# Patient Record
Sex: FEMALE | Employment: FULL TIME | URBAN - METROPOLITAN AREA
[De-identification: names, ages, dates, MRNs, and addresses within clinical notes are randomized per-mention and may not be internally consistent; named-entity substitution may affect disease eponyms.]

---

## 2017-07-01 ENCOUNTER — HOSPITAL ENCOUNTER (EMERGENCY)
Age: 32
Discharge: HOME | End: 2017-07-01
Attending: EMERGENCY MEDICINE
Payer: COMMERCIAL

## 2017-07-01 VITALS
OXYGEN SATURATION: 99 % | TEMPERATURE: 98.3 F | RESPIRATION RATE: 16 BRPM | HEART RATE: 93 BPM | BODY MASS INDEX: 30 KG/M2 | WEIGHT: 175.71 LBS | HEIGHT: 64 IN | DIASTOLIC BLOOD PRESSURE: 87 MMHG | SYSTOLIC BLOOD PRESSURE: 134 MMHG

## 2017-07-01 DIAGNOSIS — R35.0 URINARY FREQUENCY: Primary | ICD-10-CM

## 2017-07-01 LAB
APPEARANCE UR: CLEAR
BACTERIA URNS QL MICRO: NEGATIVE /HPF
BILIRUB UR QL: NEGATIVE
CLUE CELLS VAG QL WET PREP: NORMAL
COLOR UR: NORMAL
EPITH CASTS URNS QL MICRO: NORMAL /LPF
GLUCOSE BLD STRIP.AUTO-MCNC: 90 MG/DL (ref 65–100)
GLUCOSE UR STRIP.AUTO-MCNC: NEGATIVE MG/DL
HCG UR QL: NEGATIVE
HGB UR QL STRIP: NEGATIVE
HYALINE CASTS URNS QL MICRO: NORMAL /LPF (ref 0–5)
KETONES UR QL STRIP.AUTO: NEGATIVE MG/DL
KOH PREP SPEC: NORMAL
LEUKOCYTE ESTERASE UR QL STRIP.AUTO: NEGATIVE
NITRITE UR QL STRIP.AUTO: NEGATIVE
PH UR STRIP: 7.5 [PH] (ref 5–8)
PROT UR STRIP-MCNC: NEGATIVE MG/DL
RBC #/AREA URNS HPF: NORMAL /HPF (ref 0–5)
SERVICE CMNT-IMP: NORMAL
SERVICE CMNT-IMP: NORMAL
SP GR UR REFRACTOMETRY: 1.01 (ref 1–1.03)
T VAGINALIS VAG QL WET PREP: NORMAL
UA: UC IF INDICATED,UAUC: NORMAL
UROBILINOGEN UR QL STRIP.AUTO: 0.2 EU/DL (ref 0.2–1)
WBC URNS QL MICRO: NORMAL /HPF (ref 0–4)

## 2017-07-01 PROCEDURE — 74011000250 HC RX REV CODE- 250: Performed by: PHYSICIAN ASSISTANT

## 2017-07-01 PROCEDURE — 81001 URINALYSIS AUTO W/SCOPE: CPT

## 2017-07-01 PROCEDURE — 74011250636 HC RX REV CODE- 250/636: Performed by: PHYSICIAN ASSISTANT

## 2017-07-01 PROCEDURE — 81025 URINE PREGNANCY TEST: CPT

## 2017-07-01 PROCEDURE — 96372 THER/PROPH/DIAG INJ SC/IM: CPT

## 2017-07-01 PROCEDURE — 87491 CHLMYD TRACH DNA AMP PROBE: CPT

## 2017-07-01 PROCEDURE — 87210 SMEAR WET MOUNT SALINE/INK: CPT

## 2017-07-01 PROCEDURE — 74011250637 HC RX REV CODE- 250/637: Performed by: PHYSICIAN ASSISTANT

## 2017-07-01 PROCEDURE — 82962 GLUCOSE BLOOD TEST: CPT

## 2017-07-01 PROCEDURE — 99284 EMERGENCY DEPT VISIT MOD MDM: CPT

## 2017-07-01 RX ORDER — AZITHROMYCIN 250 MG/1
1000 TABLET, FILM COATED ORAL
Status: COMPLETED | OUTPATIENT
Start: 2017-07-01 | End: 2017-07-01

## 2017-07-01 RX ORDER — PHENAZOPYRIDINE HYDROCHLORIDE 200 MG/1
200 TABLET, FILM COATED ORAL 3 TIMES DAILY
Qty: 10 TAB | Refills: 0 | Status: SHIPPED | OUTPATIENT
Start: 2017-07-01 | End: 2017-07-05

## 2017-07-01 RX ADMIN — LIDOCAINE HYDROCHLORIDE 250 MG: 10 INJECTION, SOLUTION EPIDURAL; INFILTRATION; INTRACAUDAL; PERINEURAL at 15:10

## 2017-07-01 RX ADMIN — AZITHROMYCIN 1000 MG: 250 TABLET, FILM COATED ORAL at 15:10

## 2017-07-01 NOTE — ED PROVIDER NOTES
HPI Comments: Jessica Ewing is a 34 y.o. female with a PMH of depression and anxiety presenting ambulatory to the ED from home C/O urinary frequency x \"several months\". She endorses  associated symptoms of left flank pain x 1 week and vaginal discharge. She notes that her urine and discharge both have a foul odor. Pt explains that she recently began taking Lexapro and has been having shoulder, neck, and flank pain since starting the medication. She notes that the medication makes her anxious, and causes her muscles to tense. She states that she has never been evaluated for her urinary symptoms, but she has and appointment with a PCP on 4/5/17. Pt reports that she visits a Psychiatrist for the management of her depression and anxiety. She also reports that she had had normal PO intake. She denies taking birth control medication. Pt notes that she smokes approximately 1 pack of cigarettes per day. Patient denies any other symptoms or complaints. PCP: None    There are no other complaints, changes or physical findings at this time. The history is provided by the patient. No  was used. No past medical history on file. No past surgical history on file. No family history on file. Social History     Social History    Marital status:      Spouse name: N/A    Number of children: N/A    Years of education: N/A     Occupational History    Not on file. Social History Main Topics    Smoking status: Current Every Day Smoker    Smokeless tobacco: Not on file    Alcohol use Not on file    Drug use: Not on file    Sexual activity: Not on file     Other Topics Concern    Not on file     Social History Narrative         ALLERGIES: Review of patient's allergies indicates no known allergies. Review of Systems   Constitutional: Negative for chills and fever. HENT: Negative for sore throat. Eyes: Negative for pain.    Respiratory: Negative for cough and shortness of breath. Cardiovascular: Negative for chest pain. Gastrointestinal: Negative for abdominal pain, diarrhea, nausea and vomiting. Genitourinary: Positive for flank pain, frequency and vaginal discharge. Negative for dysuria and hematuria. Musculoskeletal: Negative for arthralgias and myalgias. Skin: Negative for rash. Neurological: Negative for dizziness, light-headedness, numbness and headaches. Psychiatric/Behavioral: Negative for behavioral problems and confusion. Vitals:    07/01/17 1426   BP: 134/87   Pulse: 93   Resp: 16   Temp: 98.3 °F (36.8 °C)   SpO2: 99%   Weight: 79.7 kg (175 lb 11.3 oz)   Height: 5' 4\" (1.626 m)            Physical Exam   Constitutional: She is oriented to person, place, and time. She appears well-developed and well-nourished. Pt appears to be slightly anxious   HENT:   Head: Normocephalic and atraumatic. Right Ear: External ear normal.   Left Ear: External ear normal.   Nose: Nose normal.   Eyes: Conjunctivae and EOM are normal.   Neck: Normal range of motion. Neck supple. Cardiovascular: Normal rate, regular rhythm, normal heart sounds and intact distal pulses. No murmur heard. Pulmonary/Chest: Effort normal and breath sounds normal. She has no decreased breath sounds. She has no wheezes. Abdominal: Soft. Bowel sounds are normal. She exhibits no distension. There is no tenderness. There is no guarding. No CVA tenderness bilaterally   Musculoskeletal: Normal range of motion. She exhibits no edema or tenderness. Neurological: She is alert and oriented to person, place, and time. Skin: Skin is warm and dry. No rash noted. She is not diaphoretic. Psychiatric: Her behavior is normal. Judgment normal.   Nursing note and vitals reviewed.        MDM  Number of Diagnoses or Management Options  Urinary frequency:   Diagnosis management comments: DDx: DDx: vulvovaginal vs. vaginal candidiasis, bacterial vaginosis, trichomoniasis, gonorrhea, chlamydia, pelvic inflammatory disease, urinary tract infection, cystitis, pyelonephritis. Patient presents with vaginal discharge and increased urinary frequency for the past month. No acute abnormalities found in lab work today. Advised that symptoms may be related to recently starting Lexapro. Advised f/u with her Psychiatrist for further medication management. Amount and/or Complexity of Data Reviewed  Clinical lab tests: ordered and reviewed  Review and summarize past medical records: yes    Patient Progress  Patient progress: stable    ED Course       Pelvic Exam  Date/Time: 7/1/2017 2:53 PM  Performed by: PA  Procedure duration:  10 minutes. Exam assisted by:  Cora Marte RN. Type of exam performed: bimanual and speculum. External genitalia appearance: normal.    Vaginal exam:  normal.    Cervical exam:  normal, no cervical motion tenderness and os closed. Specimen(s) collected:  chlamydia and GC. Bimanual exam:  normal.    Patient tolerance: Patient tolerated the procedure well with no immediate complications          Chief Complaint   Patient presents with    Urinary Frequency     with lower back pain x several months       2:30 PM  The patients presenting problems have been discussed, and they are in agreement with the care plan formulated and outlined with them. I have encouraged them to ask questions as they arise throughout their visit.     MEDICATIONS GIVEN:  Medications   azithromycin (ZITHROMAX) tablet 1,000 mg (1,000 mg Oral Given 7/1/17 1510)   cefTRIAXone (ROCEPHIN) 250 mg in lidocaine (PF) (XYLOCAINE) 10 mg/mL (1 %) IM injection (250 mg IntraMUSCular Given 7/1/17 1510)       LABS REVIEWED:  Recent Results (from the past 24 hour(s))   URINALYSIS W/ REFLEX CULTURE    Collection Time: 07/01/17  2:39 PM   Result Value Ref Range    Color YELLOW/STRAW      Appearance CLEAR CLEAR      Specific gravity 1.014 1.003 - 1.030      pH (UA) 7.5 5.0 - 8.0      Protein NEGATIVE  NEG mg/dL Glucose NEGATIVE  NEG mg/dL    Ketone NEGATIVE  NEG mg/dL    Bilirubin NEGATIVE  NEG      Blood NEGATIVE  NEG      Urobilinogen 0.2 0.2 - 1.0 EU/dL    Nitrites NEGATIVE  NEG      Leukocyte Esterase NEGATIVE  NEG      WBC 0-4 0 - 4 /hpf    RBC 0-5 0 - 5 /hpf    Epithelial cells FEW FEW /lpf    Bacteria NEGATIVE  NEG /hpf    UA:UC IF INDICATED CULTURE NOT INDICATED BY UA RESULT CNI      Hyaline cast 0-2 0 - 5 /lpf   HCG URINE, QL    Collection Time: 07/01/17  2:39 PM   Result Value Ref Range    HCG urine, Ql. NEGATIVE  NEG     WET PREP    Collection Time: 07/01/17  2:51 PM   Result Value Ref Range    Clue cells CLUE CELLS ABSENT      Wet prep NO TRICHOMONAS SEEN     KOH, OTHER SOURCES    Collection Time: 07/01/17  2:51 PM   Result Value Ref Range    Special Requests: NO SPECIAL REQUESTS      KOH NO YEAST SEEN     GLUCOSE, POC    Collection Time: 07/01/17  3:49 PM   Result Value Ref Range    Glucose (POC) 90 65 - 100 mg/dL    Performed by [employer]        VITAL SIGNS:  Patient Vitals for the past 12 hrs:   Temp Pulse Resp BP SpO2   07/01/17 1426 98.3 °F (36.8 °C) 93 16 134/87 99 %     PROCEDURES:  Procedure Note - Pelvic Exam:    2:52 PM  Performed by: Gunnar Grimm PA-C  Chaperoned by: Favian Rubalcava  Pelvic exam was performed using bimanual and speculum. Further findings noted in physical exam.   The procedure took 1-15 minutes, and pt tolerated well. PROGRESS NOTES:  2:30 PM  Initial assessment of patient complete, and patient was given the opportunity to ask questions. Plan of care reviewed with patient and will update when results are available. 2:30 PM  Discussed the risks of smoking and the benefits of smoking cessation as well as the long term sequelae of smoking with the pt. The patient verbalized their understanding. 2:50 PM  Written by JOHANNY Marquez, as dictated by Gunnar Grimm PA-C. Discussed with patient need for pelvic exam to r/o infectious cause for pelvic pain.  Patient agrees to have pelvic exam performed. Discussed that swabs will be taken to r/o infectious etiology of symptoms, but it is still important to see her [facility] Health provider regularly to receive pap smears and other health screening tools. Advised patient that while some tests are resulted while she is in the emergency department, the chlamydia and gonorrhea tests will not result until 48-72 hours from now. Instructed patient that she will receive a call if either of those tests return as positive. Advised patient that if they do result positive, she will need to return to the ED for treatment. Offered patient prophylactic treatment for GC/Chlamydia while she is in the ED if there is suspicion that these may result as positive. Patient accepts treatment at this time. 3:54 PM  I have just reevaluated the patient. I have reviewed her vital signs and determined there is currently no worsening in their condition or physical exam. Results have been reviewed with them and their questions have been answered. 3:54 PM  I have reviewed discharge instructions with the patient and explained medications that she is being discharged with. The patient verbalized understanding and agrees with plan. DIAGNOSIS:    1. Urinary frequency        PLAN:  1. Discharge home  2. Medications as directed  3. F/u with PCP in 2-3 days  4. Return precautions reviewed      Follow-up Information     Follow up With Details Comments Contact Info    Establish primary care physician       [facility]  As needed, If symptoms worsen [address] [facility] [id number] [phone]        Discharge Medication List as of 7/1/2017  3:54 PM      START taking these medications    Details   phenazopyridine (PYRIDIUM) 200 mg tablet Take 1 Tab by mouth three (3) times daily for 10 doses. , Normal, Disp-10 Tab, R-0         CONTINUE these medications which have NOT CHANGED    Details   ESCITALOPRAM OXALATE (LEXAPRO PO) Take  by mouth., Historical Med      DEXTROAMPHETAMINE/AMPHETAMINE (ADDERALL PO) Take  by mouth daily. , Historical Med      CLONAZEPAM (KLONOPIN PO) Take  by mouth daily. , Historical Med               ED COURSE: The patients hospital course has been uncomplicated. DISCHARGE NOTE:  3:54 PM  The care plan has been outline with the patient and/or family, who verbally conveyed understanding and agreement. Available results have been reviewed. Patient and/or family understand the follow up plan as outlined and discharge instructions. Should their condition deterioration at any time after discharge the patient agrees to return, follow up sooner than outlined or seek medical assistance at the closest Emergency Room as soon as possible. Questions have been answered.  Discharge instructions and educational information regarding the patient's diagnosis as well a list of reasons why the patient would want to seek immediate medical attention, should their condition change, were reviewed directly with the patient/family         This note will not be viewable in [facility]

## 2017-07-01 NOTE — DISCHARGE INSTRUCTIONS
Thank you for allowing us to provide you with excellent care today. We hope we addressed all of your concerns and needs. We strive to provide excellent quality care in the Emergency Department. Please rate us as excellent, as anything less than excellent does not meet our expectations. If you feel that you have not received excellent quality care or timely care, please ask to speak to the nurse manager. Please choose us in the future for your continued health care needs. The exam and treatment you received in the Emergency Department were for an urgent problem and are not intended as complete care. It is important that you follow-up with a doctor, nurse practitioner, or physician assistant to:  (1) confirm your diagnosis,  (2) re-evaluation of changes in your illness and treatment, and  (3) for ongoing care. If your symptoms become worse or you do not improve as expected and you are unable to reach your usual health care provider, you should return to the Emergency Department. We are available 24 hours a day. Take this sheet with you when you go to your follow-up visit. If you have any problem arranging the follow-up visit, contact [address] [place] [address] 21 [phone])    Make an appointment with your Primary Care doctor for follow up of this visit. Return to the ER if you are unable to be seen in the time recommended on your discharge instructions. Frequent Urination: Care Instructions  Your Care Instructions  An urge to urinate frequently but usually passing only small amounts of urine is a common symptom of urinary problems, such as urinary tract infections. The bladder may become inflamed. This can cause the urge to urinate. You may try to urinate more often than usual to try to soothe that urge. Frequent urination also may be caused by sexually transmitted infections (STIs) or kidney stones.  Or it may happen when something irritates the tube that carries urine from the bladder to the outside of the body (urethra). It may also be a sign of diabetes. The cause may be hard to find. You may need tests. Follow-up care is a key part of your treatment and safety. Be sure to make and go to all appointments, and call your doctor if you are having problems. It's also a good idea to know your test results and keep a list of the medicines you take. How can you care for yourself at home? · Drink extra water for the next day or two. This will help make the urine less concentrated. (If you have kidney, heart, or liver disease and have to limit fluids, talk with your doctor before you increase the amount of fluids you drink.)  · Avoid drinks that are carbonated or have caffeine. They can irritate the bladder. For women:  · Urinate right after you have sex. · After you go to the bathroom, wipe from front to back. · Avoid douches, bubble baths, and feminine hygiene sprays. And avoid other feminine hygiene products that have deodorants. When should you call for help? Call your doctor now or seek immediate medical care if:  · You have new symptoms, such as fever, nausea, or vomiting. · You have new or worse symptoms of a urinary problem. For example:  ¨ You have blood or pus in your urine. ¨ You have chills or body aches. ¨ It hurts to urinate. ¨ You have groin or belly pain. ¨ You have pain in your back just below your rib cage (the flank area). Watch closely for changes in your health, and be sure to contact your doctor if you feel thirstier than usual.  Where can you learn more? Go to http://dionisio-ortiz.info/. Enter 063 [id number] in the search box to learn more about \"Frequent Urination: Care Instructions. \"  Current as of: May 5, 2017  Content Version: 11.3  © 9916-8565 [employer]. Care instructions adapted under license by [employer] (which disclaims liability or warranty for this information).  If you have questions about a medical condition or this instruction, always ask your healthcare professional. [address] any warranty or liability for your use of this information.

## 2017-07-01 NOTE — ED NOTES
Patient discharged by [facility] PA with paperwork and prescriptions.   Pt declined wheelchair and walks with steady gait to brittaney

## 2017-07-01 NOTE — ED NOTES
Assumed care of patient. Patient placed in position of comfort. Call bell in reach. Skin warm and dry. Respirations even and unlabored. In no apparent distress at this time. Pt presents ambulatory into the ED with c/o lower back pain, foul smelling urine, urinary frequency and foul smelling vaginal discharge.  Denies n/v/d.

## 2017-07-03 LAB
C TRACH DNA SPEC QL NAA+PROBE: NEGATIVE
N GONORRHOEA DNA SPEC QL NAA+PROBE: NEGATIVE
SAMPLE TYPE: NORMAL
SERVICE CMNT-IMP: NORMAL
SPECIMEN SOURCE: NORMAL

## 2017-09-29 ENCOUNTER — HOSPITAL ENCOUNTER (EMERGENCY)
Age: 32
Discharge: HOME | End: 2017-09-29
Attending: EMERGENCY MEDICINE
Payer: COMMERCIAL

## 2017-09-29 ENCOUNTER — APPOINTMENT (OUTPATIENT)
Dept: ULTRASOUND IMAGING | Age: 32
End: 2017-09-29
Attending: EMERGENCY MEDICINE
Payer: COMMERCIAL

## 2017-09-29 VITALS
RESPIRATION RATE: 16 BRPM | SYSTOLIC BLOOD PRESSURE: 119 MMHG | HEIGHT: 64 IN | HEART RATE: 79 BPM | WEIGHT: 187.61 LBS | DIASTOLIC BLOOD PRESSURE: 60 MMHG | TEMPERATURE: 98.7 F | OXYGEN SATURATION: 99 % | BODY MASS INDEX: 32.03 KG/M2

## 2017-09-29 DIAGNOSIS — M54.30 BACK PAIN WITH SCIATICA: ICD-10-CM

## 2017-09-29 DIAGNOSIS — M54.9 BACK PAIN WITH SCIATICA: ICD-10-CM

## 2017-09-29 DIAGNOSIS — R10.2 PELVIC PAIN IN FEMALE: Primary | ICD-10-CM

## 2017-09-29 DIAGNOSIS — N94.6 DYSMENORRHEA: ICD-10-CM

## 2017-09-29 LAB
ALBUMIN SERPL-MCNC: 3.6 G/DL (ref 3.5–5)
ALBUMIN/GLOB SERPL: 1 {RATIO} (ref 1.1–2.2)
ALP SERPL-CCNC: 63 U/L (ref 45–117)
ALT SERPL-CCNC: 43 U/L (ref 12–78)
ANION GAP SERPL CALC-SCNC: 8 MMOL/L (ref 5–15)
APPEARANCE UR: CLEAR
AST SERPL-CCNC: 26 U/L (ref 15–37)
BACTERIA URNS QL MICRO: ABNORMAL /HPF
BASOPHILS # BLD: 0.1 K/UL (ref 0–0.1)
BASOPHILS NFR BLD: 1 % (ref 0–1)
BILIRUB SERPL-MCNC: 0.4 MG/DL (ref 0.2–1)
BILIRUB UR QL: NEGATIVE
BUN SERPL-MCNC: 11 MG/DL (ref 6–20)
BUN/CREAT SERPL: 14 (ref 12–20)
CALCIUM SERPL-MCNC: 8.9 MG/DL (ref 8.5–10.1)
CHLORIDE SERPL-SCNC: 104 MMOL/L (ref 97–108)
CO2 SERPL-SCNC: 26 MMOL/L (ref 21–32)
COLOR UR: ABNORMAL
CREAT SERPL-MCNC: 0.81 MG/DL (ref 0.55–1.02)
DIFFERENTIAL METHOD BLD: ABNORMAL
EOSINOPHIL # BLD: 0.5 K/UL (ref 0–0.4)
EOSINOPHIL NFR BLD: 4 % (ref 0–7)
EPITH CASTS URNS QL MICRO: ABNORMAL /LPF
ERYTHROCYTE [DISTWIDTH] IN BLOOD BY AUTOMATED COUNT: 12.6 % (ref 11.5–14.5)
GLOBULIN SER CALC-MCNC: 3.7 G/DL (ref 2–4)
GLUCOSE SERPL-MCNC: 97 MG/DL (ref 65–100)
GLUCOSE UR STRIP.AUTO-MCNC: NEGATIVE MG/DL
HCG UR QL: NEGATIVE
HCT VFR BLD AUTO: 38 % (ref 35–47)
HGB BLD-MCNC: 12.8 G/DL (ref 11.5–16)
HGB UR QL STRIP: ABNORMAL
KETONES UR QL STRIP.AUTO: NEGATIVE MG/DL
LEUKOCYTE ESTERASE UR QL STRIP.AUTO: NEGATIVE
LYMPHOCYTES # BLD: 3.6 K/UL (ref 0.8–3.5)
LYMPHOCYTES NFR BLD: 31 % (ref 12–49)
MCH RBC QN AUTO: 31.7 PG (ref 26–34)
MCHC RBC AUTO-ENTMCNC: 33.7 G/DL (ref 30–36.5)
MCV RBC AUTO: 94.1 FL (ref 80–99)
MONOCYTES # BLD: 1.3 K/UL (ref 0–1)
MONOCYTES NFR BLD: 11 % (ref 5–13)
NEUTS SEG # BLD: 6.4 K/UL (ref 1.8–8)
NEUTS SEG NFR BLD: 53 % (ref 32–75)
NITRITE UR QL STRIP.AUTO: NEGATIVE
PH UR STRIP: 7 [PH] (ref 5–8)
PLATELET # BLD AUTO: 292 K/UL (ref 150–400)
POTASSIUM SERPL-SCNC: 4.1 MMOL/L (ref 3.5–5.1)
PROT SERPL-MCNC: 7.3 G/DL (ref 6.4–8.2)
PROT UR STRIP-MCNC: NEGATIVE MG/DL
RBC # BLD AUTO: 4.04 M/UL (ref 3.8–5.2)
RBC #/AREA URNS HPF: ABNORMAL /HPF (ref 0–5)
SODIUM SERPL-SCNC: 138 MMOL/L (ref 136–145)
SP GR UR REFRACTOMETRY: <1.005 (ref 1–1.03)
UR CULT HOLD, URHOLD: NORMAL
UROBILINOGEN UR QL STRIP.AUTO: 0.2 EU/DL (ref 0.2–1)
WBC # BLD AUTO: 11.9 K/UL (ref 3.6–11)
WBC URNS QL MICRO: ABNORMAL /HPF (ref 0–4)

## 2017-09-29 PROCEDURE — 81025 URINE PREGNANCY TEST: CPT

## 2017-09-29 PROCEDURE — 76856 US EXAM PELVIC COMPLETE: CPT

## 2017-09-29 PROCEDURE — 96374 THER/PROPH/DIAG INJ IV PUSH: CPT

## 2017-09-29 PROCEDURE — 99283 EMERGENCY DEPT VISIT LOW MDM: CPT

## 2017-09-29 PROCEDURE — 51798 US URINE CAPACITY MEASURE: CPT

## 2017-09-29 PROCEDURE — 74011250636 HC RX REV CODE- 250/636: Performed by: EMERGENCY MEDICINE

## 2017-09-29 PROCEDURE — 76830 TRANSVAGINAL US NON-OB: CPT

## 2017-09-29 PROCEDURE — 85025 COMPLETE CBC W/AUTO DIFF WBC: CPT | Performed by: NURSE PRACTITIONER

## 2017-09-29 PROCEDURE — 80053 COMPREHEN METABOLIC PANEL: CPT | Performed by: NURSE PRACTITIONER

## 2017-09-29 PROCEDURE — 81001 URINALYSIS AUTO W/SCOPE: CPT | Performed by: NURSE PRACTITIONER

## 2017-09-29 PROCEDURE — 74011250637 HC RX REV CODE- 250/637: Performed by: NURSE PRACTITIONER

## 2017-09-29 RX ORDER — NAPROXEN 250 MG/1
500 TABLET ORAL
Status: COMPLETED | OUTPATIENT
Start: 2017-09-29 | End: 2017-09-29

## 2017-09-29 RX ORDER — KETOROLAC TROMETHAMINE 30 MG/ML
15 INJECTION, SOLUTION INTRAMUSCULAR; INTRAVENOUS
Status: COMPLETED | OUTPATIENT
Start: 2017-09-29 | End: 2017-09-29

## 2017-09-29 RX ORDER — HYDROCODONE BITARTRATE AND ACETAMINOPHEN 5; 325 MG/1; MG/1
1 TABLET ORAL
Qty: 12 TAB | Refills: 0 | Status: SHIPPED | OUTPATIENT
Start: 2017-09-29

## 2017-09-29 RX ORDER — NAPROXEN 500 MG/1
500 TABLET ORAL 2 TIMES DAILY WITH MEALS
Qty: 20 TAB | Refills: 0 | Status: SHIPPED | OUTPATIENT
Start: 2017-09-29

## 2017-09-29 RX ADMIN — KETOROLAC TROMETHAMINE 15 MG: 30 INJECTION, SOLUTION INTRAMUSCULAR at 16:44

## 2017-09-29 RX ADMIN — NAPROXEN 500 MG: 250 TABLET ORAL at 18:43

## 2017-09-29 NOTE — ED NOTES
Plan of care and all tests ordered explained to pt. Good understanding and agreement with plan was verbalized.

## 2017-09-29 NOTE — ED NOTES
Discharge note: The patient was discharged home in stable condition, accompanied by friend. The patient is alert and oriented, is in no respiratory distress and has vital signs within normal limits. The patient's diagnosis, condition and treatment were explained to patient by Bijal Cat NP and reinforced by nurse. The patient expressed understanding of discharge instructions, prescriptions, and plan of care. A work note was given to pt. A discharge plan has been developed. A  was not involved in the process. Patient offered a wheelchair to ED lob for discharge but declined at this time. Patient ambulatory to ED lobby to go home with friend.

## 2017-09-29 NOTE — DISCHARGE INSTRUCTIONS
Painful Menstrual Cramps: Care Instructions  Your Care Instructions    Painful menstrual cramps are very common. Many women go to the doctor because of bad cramps when they get their period. You may have cramps in your back, thighs, and belly. You may also have diarrhea, constipation, or nausea. Some women also get dizzy. Pain medicine and home treatment can help you feel better. Follow-up care is a key part of your treatment and safety. Be sure to make and go to all appointments, and call your doctor if you are having problems. It's also a good idea to know your test results and keep a list of the medicines you take. How can you care for yourself at home? · Take anti-inflammatory medicines for pain. Ibuprofen (Advil, Motrin) and naproxen (Aleve) usually work better than aspirin. ¨ Be safe with medicines. Talk to your doctor or pharmacist before you take any of these medicines. They may not be safe if you take other medicines or have other health problems. ¨ Start taking the recommended dose of pain medicine as soon as you start to feel pain. Or you can start on the day before your period. Keep taking the medicine for as many days as you have cramps. ¨ If anti-inflammatory medicines don't help, try acetaminophen (Tylenol). ¨ Do not take two or more pain medicines at the same time unless the doctor told you to. Many pain medicines have acetaminophen, which is Tylenol. Too much acetaminophen (Tylenol) can be harmful. ¨ Read and follow all instructions on the label. · Put a heating pad set on low or a hot water bottle on your belly. Or take a warm bath. Heat improves blood flow and may help with pain. · Lie down and put a pillow under your knees. Or lie on your side and bring your knees up to your chest. This will help with any back pressure. · Get at least 30 minutes of exercise on most days of the week. This improves blood flow and may decrease pain. Walking is a good choice.  You also may want to do other activities, such as running, swimming, cycling, or playing tennis or team sports. When should you call for help? Call 911 anytime you think you may need emergency care. For example, call if:  · You passed out (lost consciousness). Call your doctor now or seek immediate medical care if:  · You have sudden, severe pain in your belly or pelvis. · You have severe vaginal bleeding. This means that you are soaking through your usual pads or tampons every hour for 2 or more hours and passing clots of blood. · You are dizzy or lightheaded, or you feel like you may faint. Watch closely for changes in your health, and be sure to contact your doctor if:  · You think you may be pregnant. · You have new belly or pelvic pain. · You are taking pain medicine, but it is not helping. · You feel weak and tired. Where can you learn more? Go to http://dionisio-ortiz.info/. Enter [id number] in the search box to learn more about \"Painful Menstrual Cramps: Care Instructions. \"  Current as of: October 13, 2016  Content Version: 11.3  © 6688-8381 [employer]. Care instructions adapted under license by [employer] (which disclaims liability or warranty for this information). If you have questions about a medical condition or this instruction, always ask your healthcare professional. [address] any warranty or liability for your use of this information.

## 2017-09-29 NOTE — ED PROVIDER NOTES
HPI Comments: Patient is a 54-year-old female who comes to the ED today via private vehicle escorted by a friend with complaints of back pain with sciatica and abdominal pain. Patient states her last 6 months she has had worsening back and abdominal pain during the start of her menstrual cycle. States nothing makes her pain better. She has tried Naprosyn or Voltaren every 12 hours without missing a dose. She states that she has pain/discomfort with urination, bloated abdomen, diaphoretic and fatigue. She denies any fever, chills, nausea or vomiting. States she does have some constipation associated with her cycles. Bending, twisting and walking make her pain worse. The friend interjected into the conversation that her friend has not been herself. She seems to be more distraught and having significant pain. The patient did not want come in today but her friend was so concerned she insisted that she come be evaluated. She has no further complaints at this time. PCP: Matilda Freire MD      Patient is a 34 y.o. female presenting with back pain. Back Pain    Associated symptoms include numbness, abdominal pain, dysuria and pelvic pain. Past Medical History:   Diagnosis Date    Ill-defined condition     sprained right ankle 9/17    Psychiatric disorder     anxiety, depression       History reviewed. No pertinent surgical history. History reviewed. No pertinent family history. Social History     Social History    Marital status:      Spouse name: N/A    Number of children: N/A    Years of education: N/A     Occupational History    Not on file.      Social History Main Topics    Smoking status: Current Every Day Smoker     Packs/day: 1.00    Smokeless tobacco: Not on file    Alcohol use 5.4 oz/week     9 Cans of beer per week      Comment: 3x/week    Drug use: No    Sexual activity: Not Currently     Birth control/ protection: None     Other Topics Concern    Not on file     Social History Narrative         ALLERGIES: Review of patient's allergies indicates no known allergies. Review of Systems   Constitutional: Positive for activity change. Respiratory: Negative for chest tightness. Gastrointestinal: Positive for abdominal distention, abdominal pain and constipation. Genitourinary: Positive for dysuria, pelvic pain and vaginal bleeding. Negative for urgency. Musculoskeletal: Positive for back pain. Neurological: Positive for numbness. Psychiatric/Behavioral: Positive for sleep disturbance. All other systems reviewed and are negative. Vitals:    09/29/17 1528 09/29/17 1811   BP: 125/70 119/60   Pulse: 81 79   Resp: 14 16   Temp: 98.5 °F (36.9 °C) 98.7 °F (37.1 °C)   SpO2: 99% 99%   Weight: 85.1 kg (187 lb 9.8 oz)    Height: 5' 4\" (1.626 m)             Physical Exam   Constitutional: She is oriented to person, place, and time. She appears well-developed and well-nourished. She is active and cooperative. Cardiovascular: Normal rate, regular rhythm and normal heart sounds. Pulmonary/Chest: Effort normal and breath sounds normal.   Abdominal: Soft. She exhibits distension. She exhibits no mass. There is tenderness in the suprapubic area. There is no rebound, no guarding and no CVA tenderness. Musculoskeletal: She exhibits tenderness. Lumbar back: She exhibits decreased range of motion, tenderness, bony tenderness, pain and spasm. She exhibits no swelling, no edema, no deformity, no laceration and normal pulse. Neurological: She is alert and oriented to person, place, and time. Psychiatric: She has a normal mood and affect. Her behavior is normal. Judgment and thought content normal.   Nursing note and vitals reviewed.        MDM  Number of Diagnoses or Management Options  Back pain with sciatica:   Dysmenorrhea:   Pelvic pain in female:   Diagnosis management comments: Assessment & Plan:   UA  UPT  CMP  CBC with DIFF    Pelvic ultrasound  Bladder scan: 67 mL  Toradol for pain    Seen by & Discussed with Dr. Ish Hamilton, DO    Pelvic US with small uterine fibroid  Pain no better controlled. Advised to take NSAIDs on a schedule. Use ice & heat for pain. Naprosyn 500mg now & DC with Rx. Follow-up as previously scheduled with PCP & GYN over the next week. Will likely need OCP to control menstrual cycles in order to control pain.  reviewed    Dwayne Huggins NP  09/29/17  6:46 PM    Upon return with discharge paperwork patient & friend were concerned about continuing treatment with naprosyn. Lites and she has continued to use NSAIDs as directed continuing NSAIDs at this point seems futile. I discussed again her options and the need to stop the pain which is her menstrual cycle. The lengthy conversation about narcotic pain medications. 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 The patient at this point and declined a CT scan due to her ultrasounds not showing ovarian torsion or a large ovarian cyst. Her Ultram, but she did not like the side effects from the medication in the past. It caused anxiety and jitteriness. I explained the narcotic medications can be a slippery slope and pelvic pain however after much discussion further small prescription of norco to help with her pain. Dwayne Huggins NP  09/29/17  7:01 PM    7:02 PM  The patient has been reevaluated. The patient is ready for discharge. The patient's signs, symptoms, diagnosis, and discharge instructions have been discussed and the patient/ family has conveyed their understanding. The patient is to follow up as recommended or return to the ED should their symptoms worsen. Plan has been discussed and the patient is in agreement.     LABORATORY TESTS:  Recent Results (from the past 12 hour(s))  -HCG URINE, QL. - POC  Collection Time: 09/29/17  4:12 PM       Result                                            Value                         Ref Range Pregnancy test,urine (POC)                        NEGATIVE                      NEG                        -URINALYSIS W/MICROSCOPIC  Collection Time: 09/29/17  4:26 PM       Result                                            Value                         Ref Range                       Color                                             STRAW                                                         Appearance                                        CLEAR                         CLEAR                           Specific gravity                                  <1.005                        1.003 - 1.030                   pH (UA)                                           7.0                           5.0 - 8.0                       Protein                                           NEGATIVE                      NEG mg/dL                       Glucose                                           NEGATIVE                      NEG mg/dL                       Ketone                                            NEGATIVE                      NEG mg/dL                       Bilirubin                                         NEGATIVE                      NEG                             Blood                                             MODERATE (A)                  NEG                             Urobilinogen                                      0.2                           0.2 - 1.0 EU/dL                 Nitrites                                          NEGATIVE                      NEG                             Leukocyte Esterase                                NEGATIVE                      NEG                             WBC                                               0-4                           0 - 4 /hpf                      RBC                                               0-5                           0 - 5 /hpf                      Epithelial cells                                  FEW                           FEW /lpf                        Bacteria                                          1+ (A)                        NEG /hpf                   -URINE CULTURE HOLD SAMPLE  Collection Time: 09/29/17  4:26 PM       Result                                            Value                         Ref Range                       Urine culture hold                                                                                          URINE ON HOLD IN MICROBIOLOGY DEPT FOR 3 DAYS  -CBC WITH AUTOMATED DIFF  Collection Time: 09/29/17  4:26 PM       Result                                            Value                         Ref Range                       WBC                                               11.9 (H)                      3.6 - 11.0 K/uL                 RBC                                               4.04                          3.80 - 5.20 M/uL                HGB                                               12.8                          11.5 - 16.0 g/dL                HCT                                               38.0                          35.0 - 47.0 %                   MCV                                               94.1                          80.0 - 99.0 FL                  MCH                                               31.7                          26.0 - 34.0 PG                  MCHC                                              33.7                          30.0 - 36.5 g/dL                RDW                                               12.6                          11.5 - 14.5 %                   PLATELET                                          292                           150 - 400 K/uL                  NEUTROPHILS                                       53                            32 - 75 %                       LYMPHOCYTES                                       31                            12 - 49 %                       MONOCYTES                                         11 5 - 13 %                        EOSINOPHILS                                       4                             0 - 7 %                         BASOPHILS                                         1                             0 - 1 %                         ABS. NEUTROPHILS                                  6.4                           1.8 - 8.0 K/UL                  ABS. LYMPHOCYTES                                  3.6 (H)                       0.8 - 3.5 K/UL                  ABS. MONOCYTES                                    1.3 (H)                       0.0 - 1.0 K/UL                  ABS. EOSINOPHILS                                  0.5 (H)                       0.0 - 0.4 K/UL                  ABS.  BASOPHILS                                    0.1                           0.0 - 0.1 K/UL                  DF                                                AUTOMATED                                                -METABOLIC PANEL, COMPREHENSIVE  Collection Time: 09/29/17  4:26 PM       Result                                            Value                         Ref Range                       Sodium                                            138                           136 - 145 mmol/L                Potassium                                         4.1                           3.5 - 5.1 mmol/L                Chloride                                          104                           97 - 108 mmol/L                 CO2                                               26                            21 - 32 mmol/L                  Anion gap                                         8                             5 - 15 mmol/L                   Glucose                                           97                            65 - 100 mg/dL                  BUN                                               11                            6 - 20 MG/DL                    Creatinine 0.81                          0.55 - 1.02 MG/DL               BUN/Creatinine ratio                              14                            12 - 20                         GFR est AA                                        >60                           >60 ml/min/1.73m2               GFR est non-AA                                    >60                           >60 ml/min/1.73m2               Calcium                                           8.9                           8.5 - 10.1 MG/DL                Bilirubin, total                                  0.4                           0.2 - 1.0 MG/DL                 ALT (SGPT)                                        43                            12 - 78 U/L                     AST (SGOT)                                        26                            15 - 37 U/L                     Alk. phosphatase                                  63                            45 - 117 U/L                    Protein, total                                    7.3                           6.4 - 8.2 g/dL                  Albumin                                           3.6                           3.5 - 5.0 g/dL                  Globulin                                          3.7                           2.0 - 4.0 g/dL                  A-G Ratio                                         1.0 (L)                       1.1 - 2.2                    IMAGING RESULTS:  US TRANSVAGINAL   Final Result     US PELV NON OBS   Final Result     Us Transvaginal    Result Date: 9/29/2017  INDICATION: pelvic pain. . COMPARISON:  None. . TECHNIQUE: Real-time sonography of the pelvis was performed using the urine filled bladder as an acoustic window. Multiple static images of the uterus and ovaries were obtained. Transvaginal sonography was performed with multiple static images of the uterus and ovaries obtained. . TRANS ABDOMINAL FINDINGS: The uterus measures 6.6 x 3.2 x 4 cm.  The endometrial stripe is not well visualized due to body habitus and bowel gas. The adnexal structures are not confidently identified secondary to body habitus and overlying bowel gas. There is no visualized mass or fluid in the pelvic cul-de-sac. Alison Eulalio TRANS VAGINAL FINDINGS: The endometrial stripe measures 3.3 cm. The uterus measures approximately 6.7 x 2.8 x 3.9 cm. Slightly heterogeneous echogenicity in the uterine fundus suggesting a small fibroid. The right ovary measures 3.3 x 2 x 2.1 cm and the left ovary measures 2.2 x 1.1 x 1.2 cm. Likely physiologic cyst in the right adnexa. There is no visualized fluid or mass in the pelvic cul-de-sac. Alison Eulalio IMPRESSION:  1. Uterine fibroid. Us Pelv Non Obs    Result Date: 9/29/2017  INDICATION: pelvic pain. . COMPARISON:  None. . TECHNIQUE: Real-time sonography of the pelvis was performed using the urine filled bladder as an acoustic window. Multiple static images of the uterus and ovaries were obtained. Transvaginal sonography was performed with multiple static images of the uterus and ovaries obtained. . TRANS ABDOMINAL FINDINGS: The uterus measures 6.6 x 3.2 x 4 cm. The endometrial stripe is not well visualized due to body habitus and bowel gas. The adnexal structures are not confidently identified secondary to body habitus and overlying bowel gas. There is no visualized mass or fluid in the pelvic cul-de-sac. Alison Eulalio TRANS VAGINAL FINDINGS: The endometrial stripe measures 3.3 cm. The uterus measures approximately 6.7 x 2.8 x 3.9 cm. Slightly heterogeneous echogenicity in the uterine fundus suggesting a small fibroid. The right ovary measures 3.3 x 2 x 2.1 cm and the left ovary measures 2.2 x 1.1 x 1.2 cm. Likely physiologic cyst in the right adnexa. There is no visualized fluid or mass in the pelvic cul-de-sac. Alison Eulalio IMPRESSION:  1. Uterine fibroid.          MEDICATIONS GIVEN:  Medications  ketorolac (TORADOL) injection 15 mg (15 mg IntraVENous Given 9/29/17 9876)  naproxen (NAPROSYN) tablet 500 mg (500 mg Oral Given 9/29/17 3323)    IMPRESSION:  Pelvic pain in female  (primary encounter diagnosis)  Back pain with sciatica  Dysmenorrhea    PLAN:  1. Discharge Medication List as of 9/29/2017  6:23 PM    START taking these medications    naproxen (NAPROSYN) 500 mg tablet  Take 1 Tab by mouth two (2) times daily (with meals). Indications: Pain, Print, Disp-20 Tab, R-0      CONTINUE these medications which have NOT CHANGED    ESCITALOPRAM OXALATE (LEXAPRO PO)  Take 5 mg by mouth daily. , Historical Med    DEXTROAMPHETAMINE/AMPHETAMINE (ADDERALL PO)  Take 25 mg by mouth two (2) times a day., Historical Med    CLONAZEPAM (KLONOPIN PO)  Take 1 mg by mouth two (2) times a day., Historical Med        2.  Follow-up Information     Follow up With Details Comments MD Trev  follow-up as scheduled next [address]  [phone]      OB/GYN  Follow-up as scheduled in 1.5 weeks     [address] [place] [address]  As needed, If symptoms worsen [address]  [phone]        Return to ED for new or worsening symptoms       Didier Montgomery NP          ED Course       Procedures

## 2017-09-29 NOTE — ED TRIAGE NOTES
Pt ambulated to the treatment area with a steady gait. Pt states \"I started my period 2 days ago I started having lower back pain that radiates down both legs. I have had this pain before with my period  on and off it has become more persistent. My stomach feels bloated and I have some burning with urination and swelling in my right ankle this seems to happen when I have my period too. \"

## 2018-12-26 ENCOUNTER — HOSPITAL ENCOUNTER (OUTPATIENT)
Dept: MAMMOGRAPHY | Age: 33
Discharge: HOME | End: 2018-12-26
Attending: OBSTETRICS & GYNECOLOGY
Payer: COMMERCIAL

## 2018-12-26 DIAGNOSIS — Z80.3 FAMILY HISTORY OF MALIGNANT NEOPLASM OF BREAST: ICD-10-CM

## 2018-12-26 DIAGNOSIS — N63.25 BREAST LUMP ON LEFT SIDE AT 3 O'CLOCK POSITION: ICD-10-CM

## 2018-12-26 DIAGNOSIS — N63.24 BREAST LUMP ON LEFT SIDE AT 8 O'CLOCK POSITION: ICD-10-CM

## 2018-12-26 PROCEDURE — 76642 ULTRASOUND BREAST LIMITED: CPT

## 2018-12-26 PROCEDURE — 77066 DX MAMMO INCL CAD BI: CPT

## 2018-12-26 NOTE — PROGRESS NOTES
I left a vm for Refranjit Steve, Dr. Yessica Valentino nurse regarding left breast biopsy recommendation.

## 2019-01-02 ENCOUNTER — HOSPITAL ENCOUNTER (OUTPATIENT)
Dept: MAMMOGRAPHY | Age: 34
Discharge: HOME | End: 2019-01-02
Attending: OBSTETRICS & GYNECOLOGY
Payer: COMMERCIAL

## 2019-01-02 DIAGNOSIS — R92.8 ABNORMAL MAMMOGRAM: ICD-10-CM

## 2019-01-02 PROCEDURE — 19083 BX BREAST 1ST LESION US IMAG: CPT

## 2019-01-02 PROCEDURE — 74011250636 HC RX REV CODE- 250/636: Performed by: RADIOLOGY

## 2019-01-02 PROCEDURE — 88305 TISSUE EXAM BY PATHOLOGIST: CPT

## 2019-01-02 PROCEDURE — 74011000250 HC RX REV CODE- 250: Performed by: RADIOLOGY

## 2019-01-02 RX ORDER — LIDOCAINE HYDROCHLORIDE 10 MG/ML
15 INJECTION INFILTRATION; PERINEURAL
Status: COMPLETED | OUTPATIENT
Start: 2019-01-02 | End: 2019-01-02

## 2019-01-02 RX ORDER — LIDOCAINE HYDROCHLORIDE AND EPINEPHRINE 10; 10 MG/ML; UG/ML
10 INJECTION, SOLUTION INFILTRATION; PERINEURAL ONCE
Status: COMPLETED | OUTPATIENT
Start: 2019-01-02 | End: 2019-01-02

## 2019-01-02 RX ADMIN — LIDOCAINE HYDROCHLORIDE AND EPINEPHRINE 100 MG: 10; 10 INJECTION, SOLUTION INFILTRATION; PERINEURAL at 11:50

## 2019-01-02 RX ADMIN — LIDOCAINE HYDROCHLORIDE 15 ML: 10 INJECTION, SOLUTION INFILTRATION; PERINEURAL at 11:50

## 2019-01-02 NOTE — DISCHARGE INSTRUCTIONS
Breast Biopsy Discharge Instructions    PAIN CONTROL     You may have mild discomfort; take 1-2 Tylenol every 4-6 hours as needed.  Do not take aspirin containing products or anti-inflammatory medications (Advil, Aleve, Motrin, Ibuprofen, etc.) for 24 hours.  Wearing a comfortable bra for support may help with discomfort. WOUND CARE      A small amount of bleeding or bruising at the biopsy site is normal. Watch for signs and symptoms of infections: skin warm to touch, yellowish drainage from wound, fever or severe pain.  Place an ice pack over the site hourly, 20-30 at a time for a few hours today.  The clear dressing is water resistant (you may shower, but do not allow the dressing to become saturated). You may remove the dressing in 48 hours. The steri-strips (small pieces of tape covering the incision) will fall off on their own in a few days. If the clear dressing irritates your skin or begins to peel off, you may remove it. Remember, if you remove the clear dressing before 48 hours, you should not get the site wet.  If at any point you have EXCESSIVE BLEEDING (saturating the gauze under the clear dressing) OR pain please call:    Daytime 7:30am-5:00pm: [facility] [phone]    After Hours:    [phone] (ask to speak to a radiologist)              or [address] [facility] [phone]    ACTIVITY     Do not participate in any strenuous activities for 24 hours (exercise, sports, housework, etc.).  You may resume work (light duty only) for the first 24 hours.  No heavy lifting with the arm on the affected side of your body. ADDITIONAL INSTRUCTIONS    We will call you with results on Friday January 4 in the afternoon.       YOUR TREATMENT TEAM TODAY WAS    MD: Herminio Choi  RN: Juhi Speaker  Radiology Tech: Jody Davenport

## 2019-03-05 ENCOUNTER — OFFICE VISIT (OUTPATIENT)
Dept: INTERNAL MEDICINE CLINIC | Age: 34
End: 2019-03-05

## 2019-03-05 VITALS
RESPIRATION RATE: 18 BRPM | TEMPERATURE: 98.4 F | WEIGHT: 169.9 LBS | OXYGEN SATURATION: 96 % | DIASTOLIC BLOOD PRESSURE: 78 MMHG | HEIGHT: 64 IN | SYSTOLIC BLOOD PRESSURE: 117 MMHG | BODY MASS INDEX: 29.01 KG/M2 | HEART RATE: 71 BPM

## 2019-03-05 DIAGNOSIS — F90.9 ATTENTION DEFICIT HYPERACTIVITY DISORDER (ADHD), UNSPECIFIED ADHD TYPE: Primary | ICD-10-CM

## 2019-03-05 DIAGNOSIS — F32.A DEPRESSION, UNSPECIFIED DEPRESSION TYPE: ICD-10-CM

## 2019-03-05 DIAGNOSIS — F41.0 PANIC ATTACKS: ICD-10-CM

## 2019-03-05 DIAGNOSIS — F41.9 ANXIETY: ICD-10-CM

## 2019-03-05 RX ORDER — BUPROPION HYDROCHLORIDE 150 MG/1
TABLET ORAL
Qty: 60 TAB | Refills: 0 | Status: SHIPPED | OUTPATIENT
Start: 2019-03-05

## 2019-03-05 RX ORDER — DEXTROAMPHETAMINE SACCHARATE, AMPHETAMINE ASPARTATE MONOHYDRATE, DEXTROAMPHETAMINE SULFATE AND AMPHETAMINE SULFATE 5; 5; 5; 5 MG/1; MG/1; MG/1; MG/1
20 CAPSULE, EXTENDED RELEASE ORAL 2 TIMES DAILY
Qty: 60 CAP | Refills: 0 | Status: SHIPPED | OUTPATIENT
Start: 2019-03-05 | End: 2019-03-05 | Stop reason: SDUPTHER

## 2019-03-05 RX ORDER — DEXTROAMPHETAMINE SACCHARATE, AMPHETAMINE ASPARTATE MONOHYDRATE, DEXTROAMPHETAMINE SULFATE AND AMPHETAMINE SULFATE 5; 5; 5; 5 MG/1; MG/1; MG/1; MG/1
20 CAPSULE, EXTENDED RELEASE ORAL 2 TIMES DAILY
Qty: 60 CAP | Refills: 0 | Status: SHIPPED | OUTPATIENT
Start: 2019-03-15

## 2019-03-05 NOTE — PROGRESS NOTES
Chief Complaint   Patient presents with    Depression     she is a 28y.o. year old female who presents for evaluation of   Anxiety and/or Depression  Well controlled on clonazepam, pt states clonazepam is just for her anxiety not depression and individual therapy. Ongoing symptoms include: insomnia depressed mood and increased sleep  Patient denies: suicidal ideation, homocidal ideation. Reported side effects from the treatment: none. Reviewed and agree with Nurse Note and duplicated in this note. Reviewed PmHx, RxHx, FmHx, SocHx, AllgHx and updated and dated in the chart. Family History   Problem Relation Age of Onset    Breast Cancer Maternal Grandmother         had 3X, last double mastectomy       Past Medical History:   Diagnosis Date    Ill-defined condition     sprained right ankle 9/17    Psychiatric disorder     anxiety, depression      Social History     Socioeconomic History    Marital status: UNKNOWN     Spouse name: Not on file    Number of children: Not on file    Years of education: Not on file    Highest education level: Not on file   Tobacco Use    Smoking status: Current Every Day Smoker     Packs/day: 1.00    Smokeless tobacco: Never Used   Substance and Sexual Activity    Alcohol use:  Yes     Alcohol/week: 5.4 oz     Types: 9 Cans of beer per week     Comment: 3x/week    Drug use: No    Sexual activity: Not Currently     Birth control/protection: None        Review of Systems - negative except as listed above      Objective:     Vitals:    03/05/19 1347   BP: 117/78   Pulse: 71   Resp: 18   Temp: 98.4 °F (36.9 °C)   TempSrc: Oral   SpO2: 96%   Weight: 169 lb 14.4 oz (77.1 kg)   Height: 5' 4\" (1.626 m)       Physical Examination: General appearance - alert, well appearing, and in no distress  Chest - clear to auscultation, no wheezes, rales or rhonchi, symmetric air entry  Heart - normal rate, regular rhythm, normal S1, S2, no murmurs, rubs, clicks or gallops  Abdomen - soft, nontender, nondistended, no masses or organomegaly  Neurological - alert, oriented, normal speech, no focal findings or movement disorder noted  Musculoskeletal - no joint tenderness, deformity or swelling  Extremities - peripheral pulses normal, no pedal edema, no clubbing or cyanosis  Skin - normal coloration and turgor, no rashes, no suspicious skin lesions noted    Assessment/ Plan:   Diagnoses and all orders for this visit:    1. Attention deficit hyperactivity disorder (ADHD), unspecified ADHD type  -     amphetamine-dextroamphetamine XR (ADDERALL XR) 20 mg XR capsule; Take 1 Cap (20 mg total) by mouth two (2) times a day. Max Daily Amount: 40 mg    2. Depression, unspecified depression type  -     REFERRAL TO PSYCHIATRY    3. Panic attacks  -     REFERRAL TO PSYCHIATRY    4. Anxiety  -     REFERRAL TO PSYCHIATRY    Other orders  -     buPROPion XL (WELLBUTRIN XL) 150 mg tablet; Take 1 tab daily for 3 days and then increase if tolerated to take 1 tab p.o. twice daily    Patient will bring in her records for ADHD testing, records request sent also  Follow-up Disposition:  Return in about 2 weeks (around 3/19/2019) for Depression. I have discussed the diagnosis with the patient and the intended plan as seen in the above orders. The patient has received an after-visit summary and questions were answered concerning future plans. Medication Side Effects and Warnings were discussed with patient,  Patient Labs were reviewed and or requested, and  Patient Past Records were reviewed and or requested  yes         Pt agrees to call or return to clinic and/or go to closest ER with any worsening of symptoms. This may include, but not limited to increased fever (>100.4) with NSAIDS or Tylenol, increased edema, confusion, rash, worsening of presenting symptoms.

## 2023-01-03 NOTE — PROGRESS NOTES
Pathology approved by Dr. Pia Gabriel. Called patient and reported benign results. Patient reports that biopsy site is healing well with some bruising but otherwise no concerns. PAST SURGICAL HISTORY:  S/P abdominoplasty     S/P bilateral mastectomy     S/P breast augmentation     S/P breast reconstruction     S/P  section     S/P left oophorectomy     S/P lumpectomy, left breast